# Patient Record
Sex: MALE | Race: BLACK OR AFRICAN AMERICAN | Employment: FULL TIME | URBAN - METROPOLITAN AREA
[De-identification: names, ages, dates, MRNs, and addresses within clinical notes are randomized per-mention and may not be internally consistent; named-entity substitution may affect disease eponyms.]

---

## 2021-05-14 PROBLEM — I25.10 CAD (CORONARY ARTERY DISEASE): Status: ACTIVE | Noted: 2021-05-14

## 2021-06-18 ENCOUNTER — HOSPITAL ENCOUNTER (OUTPATIENT)
Dept: CARDIAC REHAB | Age: 66
Discharge: HOME | End: 2021-06-18
Payer: MEDICARE

## 2021-06-18 VITALS — WEIGHT: 156 LBS | BODY MASS INDEX: 25.18 KG/M2

## 2021-06-18 PROCEDURE — 93798 PHYS/QHP OP CAR RHAB W/ECG: CPT

## 2021-06-18 RX ORDER — ATORVASTATIN CALCIUM 40 MG/1
40 TABLET, FILM COATED ORAL DAILY
COMMUNITY

## 2021-06-18 RX ORDER — VARENICLINE TARTRATE 1 MG/1
1 TABLET, FILM COATED ORAL DAILY
COMMUNITY

## 2021-06-18 RX ORDER — ASPIRIN 81 MG/1
81 TABLET ORAL DAILY
COMMUNITY

## 2021-06-18 RX ORDER — IBUPROFEN 200 MG
1 TABLET ORAL EVERY 24 HOURS
COMMUNITY

## 2021-06-18 RX ORDER — LISINOPRIL 10 MG/1
10 TABLET ORAL DAILY
COMMUNITY

## 2021-06-18 RX ORDER — PRASUGREL 10 MG/1
10 TABLET, FILM COATED ORAL
COMMUNITY

## 2021-06-18 NOTE — PROGRESS NOTES
CARDIAC REHAB INITIAL ITP FOR REVIEW AND SIGNATURE    Isaac Jorgensen 72 y.o. presented to cardiac rehab for an intake and a six minute walk test today with a primary diagnosis of PCI/BERNY. Patient's EF is 60%. Reubin Merlin has a history of Coronary artery disease and status post coronary artery stenting. Cardiac risk factors include smoking/ tobacco exposure, dyslipidemia, hypertension and these were reviewed with patient. Reubin Merlin is not  and lives alone. PHQ-9, depression score, is 6 and this is considered to behigh. The result was discussed with patient who confirms score to be accurate and  a copy of patient's results were sent to patient's PCP. Patient denied chest pain or SOB during 6 minute walk and the cardiac rhythm was in Normal Sinus Rhythm. Reubin Merlin will attend exercise and educational sessions 3 days a week in cardiac rehab for 36 sessions. Goals for Rehab:    Patient name: Reubin Merlin : 1955         Goals Comments   1. Complete smoking cessation   [x] initial  [] met                  [] not met  [] progressing Patient currently smokes 3 mini cigars every day. Patient wants to completely quit in 2-3 weeks   2.  Get off blood pressure medicine using exercise and diet   [x] initial  [] met                  [] not met  [] progressing Keep blood pressure 120/80 or lower consistently by next recert       Dani Jeans, RN 2021 10:23 AM    Cardiac ITP     CR INTAKE from 2021 in [place] [address]    Treatment Diagnosis   Treatment Diagnosis 1 PCI    PCI Date 21    Referral Date 21    Individual Treatment Plan   ITP Visit Type Initial Assessment    1st Date of Exercise  21    ITP Next Review Date 21    Visit #/Total Visits     EF % 60 %    Risk Stratification Moderate    ITP Exercise, Psychosocial, Nutrition, Tobacco, Education    Exercise    Stages of Change Preparation    DASI Total Score 23.45    Assisted Devices None    Total Score  0   Test Six minute walk test    Exercise Prescription   Mode Treadmill, Bike, Ergometer, Stepper    Frequency per week 2-3    Duration per session 35-55    RPE 11-13    Target Heart Rate     Resistance Training Yes    Exercise Blood Pressures   Exercise Activity at Home   Type Lifting equipment for work [employer]   Exercise Education   Education Signs/Symptoms to report, Exercise safety, RPE scale, Equipment orientation    Exercise Target Goal   Target Goal(s) Individual exercise RX, Aerobic activity 30 + minutes/day  5 days/week, BP < 140/90 or < 130/80, if DM or CKD    Psychosocial   Stages of Change Preparation    [facility] Total Score 29    PHQ 9 Score 6    Psychosocial Intervention   Interventions Cardiologist notified, PCP notified    Currently Taking Psychotropic Meds No    Psychosocial Education   Education Impact self care behaviors on health, Relaxation techniques, Sexual activity, Stress management class    Psychosocial Target Goals   Target Goal(s) Engages in self-care behaviors, Maximizes coping skills, Positive support group, Assess presence or absence of depression using a valid screening tool, Demonstrates appropriate interaction with others    Uses Stress Mgmt Techniques Yes    Nutrition   Stages of Change Preparation    Diabetes No    Lipids   Date Lipids Drawn 05/15/21    Total 142    HDL 52    LDL 66    Triglycerides 124    Lipid Med(s) Lipitor    Lipid Med Change(s) No    Weight Management   Height  5' 6\" (1.676 m)    Alcohol Weekly    Type 2 mixed drinks     Amount twice a week    Rate Your Plate Total Score 48    Nutrition Intervention   Dietitian Consult No    Nurse/Patient Discussion Yes    Nutrition Class Yes    Nutrition Education   Education Low fat & cholesterol diet, Low sodium diet, Carb-controlled diet, Healthy eating    Nutrition Target Goals   Education   Learning Barrier [facility] to learn [facility] Intervention   Education Schedule Given Yes    Patient Education    Education CAD, Risk factors, Med Compliance, Cardiac A&P, Signs/Symptoms of Angina    Hypertension Yes    Hypertension Controlled Yes    Is BP WDL?  Yes    Med(s) Change No    BP Meds Lisinopril    Education Target Goals   Target Goals Understand target guidelines for lipids, Understand target guidelines for B/P, Risk factors, Medication compliance    Physician Response   Exercise     CR INTAKE from 6/18/2021 in [facility]   Any problems changes since your last visit Other (comment)  [initial visit]   Any symptoms while exercising denies   Psychosocial/Stress Level 0   Resting EKG rhythm SR   Tobacco Use Current   Tobacco Cessation Interventions Verbal counseling   Enter O2 Saturation and Liter Flow 99   ITP Next Review Date 07/18/21   Visit Number/Total Visits 1/36   What is plan for next session start exercise Rx   On Call Medical Director Immediately Available Lexa   Target Heart Rate(Range)    Resting HR 69   Resting /66   Recovery HR 68   Recovery /68   Weight 70.8 kg (156 lb)   Exercise EKG Rhythm SR   Exercise Duration 6   Peak HR 94   Peak /61   Peak RPE 7   Peak Mets 1.7   Asymptomatic yes   O2 Saturation 99   Total Minutes 6

## 2021-06-21 ENCOUNTER — HOSPITAL ENCOUNTER (OUTPATIENT)
Dept: CARDIAC REHAB | Age: 66
Discharge: HOME | End: 2021-06-21
Payer: MEDICARE

## 2021-06-21 VITALS — BODY MASS INDEX: 25.18 KG/M2 | WEIGHT: 156 LBS

## 2021-06-21 PROCEDURE — 93798 PHYS/QHP OP CAR RHAB W/ECG: CPT

## 2021-06-23 ENCOUNTER — HOSPITAL ENCOUNTER (OUTPATIENT)
Dept: CARDIAC REHAB | Age: 66
Discharge: HOME | End: 2021-06-23
Payer: MEDICARE

## 2021-06-23 VITALS — WEIGHT: 157 LBS | BODY MASS INDEX: 25.34 KG/M2

## 2021-06-23 PROCEDURE — 93798 PHYS/QHP OP CAR RHAB W/ECG: CPT

## 2021-06-24 ENCOUNTER — HOSPITAL ENCOUNTER (OUTPATIENT)
Dept: CARDIAC REHAB | Age: 66
Discharge: HOME | End: 2021-06-24
Payer: MEDICARE

## 2021-06-24 VITALS — WEIGHT: 158 LBS | BODY MASS INDEX: 25.5 KG/M2

## 2021-06-24 PROCEDURE — 93798 PHYS/QHP OP CAR RHAB W/ECG: CPT

## 2021-06-28 ENCOUNTER — HOSPITAL ENCOUNTER (OUTPATIENT)
Dept: CARDIAC REHAB | Age: 66
Discharge: HOME | End: 2021-06-28
Payer: MEDICARE

## 2021-06-28 VITALS — WEIGHT: 160 LBS | BODY MASS INDEX: 25.82 KG/M2

## 2021-06-28 PROCEDURE — 93798 PHYS/QHP OP CAR RHAB W/ECG: CPT

## 2021-06-29 ENCOUNTER — HOSPITAL ENCOUNTER (OUTPATIENT)
Dept: CARDIAC REHAB | Age: 66
Discharge: HOME | End: 2021-06-29
Payer: MEDICARE

## 2021-06-29 VITALS — BODY MASS INDEX: 25.26 KG/M2 | WEIGHT: 156.5 LBS

## 2021-06-29 PROCEDURE — 93798 PHYS/QHP OP CAR RHAB W/ECG: CPT

## 2021-06-30 ENCOUNTER — APPOINTMENT (OUTPATIENT)
Dept: CARDIAC REHAB | Age: 66
End: 2021-06-30
Payer: MEDICARE

## 2021-07-01 ENCOUNTER — HOSPITAL ENCOUNTER (OUTPATIENT)
Dept: CARDIAC REHAB | Age: 66
Discharge: HOME | End: 2021-07-01
Payer: MEDICARE

## 2021-07-01 VITALS — BODY MASS INDEX: 25.34 KG/M2 | WEIGHT: 157 LBS

## 2021-07-01 PROCEDURE — 93798 PHYS/QHP OP CAR RHAB W/ECG: CPT

## 2021-07-02 ENCOUNTER — HOSPITAL ENCOUNTER (OUTPATIENT)
Dept: CARDIAC REHAB | Age: 66
Discharge: HOME | End: 2021-07-02
Payer: MEDICARE

## 2021-07-02 VITALS — BODY MASS INDEX: 25.5 KG/M2 | WEIGHT: 158 LBS

## 2021-07-02 PROCEDURE — 93798 PHYS/QHP OP CAR RHAB W/ECG: CPT

## 2021-07-05 ENCOUNTER — APPOINTMENT (OUTPATIENT)
Dept: CARDIAC REHAB | Age: 66
End: 2021-07-05
Payer: MEDICARE

## 2021-07-06 ENCOUNTER — HOSPITAL ENCOUNTER (OUTPATIENT)
Dept: CARDIAC REHAB | Age: 66
Discharge: HOME | End: 2021-07-06
Payer: MEDICARE

## 2021-07-06 VITALS — BODY MASS INDEX: 25.5 KG/M2 | WEIGHT: 158 LBS

## 2021-07-06 PROCEDURE — 93798 PHYS/QHP OP CAR RHAB W/ECG: CPT

## 2021-07-07 ENCOUNTER — HOSPITAL ENCOUNTER (OUTPATIENT)
Dept: CARDIAC REHAB | Age: 66
Discharge: HOME | End: 2021-07-07
Payer: MEDICARE

## 2021-07-07 VITALS — BODY MASS INDEX: 25.34 KG/M2 | WEIGHT: 157 LBS

## 2021-07-07 PROCEDURE — 93798 PHYS/QHP OP CAR RHAB W/ECG: CPT

## 2021-07-08 ENCOUNTER — HOSPITAL ENCOUNTER (OUTPATIENT)
Dept: CARDIAC REHAB | Age: 66
Discharge: HOME | End: 2021-07-08
Payer: MEDICARE

## 2021-07-08 VITALS — WEIGHT: 158.5 LBS | BODY MASS INDEX: 25.58 KG/M2

## 2021-07-08 PROCEDURE — 93798 PHYS/QHP OP CAR RHAB W/ECG: CPT

## 2021-07-09 ENCOUNTER — HOSPITAL ENCOUNTER (OUTPATIENT)
Dept: CARDIAC REHAB | Age: 66
Discharge: HOME | End: 2021-07-09
Payer: MEDICARE

## 2021-07-09 VITALS — WEIGHT: 158 LBS | BODY MASS INDEX: 25.5 KG/M2

## 2021-07-09 PROCEDURE — 93798 PHYS/QHP OP CAR RHAB W/ECG: CPT

## 2021-07-12 ENCOUNTER — HOSPITAL ENCOUNTER (OUTPATIENT)
Dept: CARDIAC REHAB | Age: 66
Discharge: HOME | End: 2021-07-12
Payer: MEDICARE

## 2021-07-12 VITALS — BODY MASS INDEX: 25.5 KG/M2 | WEIGHT: 158 LBS

## 2021-07-12 PROCEDURE — 93798 PHYS/QHP OP CAR RHAB W/ECG: CPT

## 2021-07-13 ENCOUNTER — HOSPITAL ENCOUNTER (OUTPATIENT)
Dept: CARDIAC REHAB | Age: 66
Discharge: HOME | End: 2021-07-13
Payer: MEDICARE

## 2021-07-13 VITALS — BODY MASS INDEX: 25.5 KG/M2 | WEIGHT: 158 LBS

## 2021-07-13 PROCEDURE — 93798 PHYS/QHP OP CAR RHAB W/ECG: CPT

## 2021-07-14 ENCOUNTER — APPOINTMENT (OUTPATIENT)
Dept: CARDIAC REHAB | Age: 66
End: 2021-07-14
Payer: MEDICARE

## 2021-07-15 ENCOUNTER — HOSPITAL ENCOUNTER (OUTPATIENT)
Dept: CARDIAC REHAB | Age: 66
Discharge: HOME | End: 2021-07-15
Payer: MEDICARE

## 2021-07-15 VITALS — WEIGHT: 158 LBS | BODY MASS INDEX: 25.5 KG/M2

## 2021-07-15 PROCEDURE — 93798 PHYS/QHP OP CAR RHAB W/ECG: CPT

## 2021-07-16 ENCOUNTER — HOSPITAL ENCOUNTER (OUTPATIENT)
Dept: CARDIAC REHAB | Age: 66
Discharge: HOME | End: 2021-07-16
Payer: MEDICARE

## 2021-07-16 VITALS — BODY MASS INDEX: 25.5 KG/M2 | WEIGHT: 158 LBS

## 2021-07-16 PROCEDURE — 93798 PHYS/QHP OP CAR RHAB W/ECG: CPT

## 2021-07-16 NOTE — PROGRESS NOTES
CARDIAC REHAB ITP REASSESSMENT FOR REVIEW AND SIGNATURE  Patient name: Jana Chen : 1955     Visits from Start of Care: 15      Reporting Period:  to     Subjective Reports:  No complaints, progressing well    Goals Comments   1. 1. Complete smoking cessation   [] met                  [] not met  [x] progressing Patient currently smokes 3 mini cigars every day. Patient wants to completely quit in 2-3 weeks. Pt. Just prescribed new medication to help smoking cessation    2. Get off blood pressure medicine using exercise and diet    [] met                  [] not met  [x] progressing Keep blood pressure 120/80 or lower consistently by next recert     Key functional changes: Pt. Increased peak METS on the treadmill from 0.8 to 2.0 mph during this recert    Problems/ barriers to goal attainment: None     Assessment / Recommendations: Continue w/ rehab    Maria M Bazzi RN 2021 9:44 AM    Cardiac ITP     CR PHASE II from 7/15/2021 in [place] [address]   Treatment Diagnosis 1 PCI   PCI Date 21   Referral Date 21   ITP Visit Type Re-Assessment   1st Date of Exercise  21   ITP Next Review Date 21   Visit #/Total Visits 15/36   EF % 60 %   Risk Stratification Moderate   ITP Exercise, Psychosocial, Nutrition, Tobacco, Education   Stages of Change Action   Assisted Devices None   Test Six minute walk test   Mode Treadmill, Bike, Ergometer, Stepper   Frequency per week 2-3   Duration per session 35-55   Intensity  METS       1.7-3.0   RPE 11-13   Target Heart Rate    Resistance Training Yes   Resting /60   Peak /61   Is BP WDL?  Yes   Type Lifting equipment for work   Education Signs/Symptoms to report, Exercise safety, RPE scale, Equipment orientation   Target Goal(s) Individual exercise RX, Aerobic activity 30 + minutes/day  5 days/week, BP < 140/90 or < 130/80, if DM or CKD   Stages of Change Action   Interventions Cardiologist notified   Currently Taking Psychotropic Meds No   Education Impact self care behaviors on health, Relaxation techniques, Sexual activity, Stress management class   Target Goal(s) Engages in self-care behaviors, Maximizes coping skills, Positive support group, Assess presence or absence of depression using a valid screening tool, Demonstrates appropriate interaction with others   Uses Stress Mgmt Techniques Yes   Stages of Change Action   Diabetes No   Date Lipids Drawn 05/15/21   Total 142   HDL 52   LDL 66   Triglycerides 124   Lipid Med(s) Lipitor   Lipid Med Change(s) No   Weight  71.7 kg (158 lb)   Height  5' 6\" (1.676 m)   BMI 25.56   Alcohol Weekly   Type 2 mixed drinks    Amount twice a week   Dietitian Consult No   Nurse/Patient Discussion Yes   Nutrition Class Yes   Learning Barrier Ready to learn   Education Schedule Given Yes   Education CAD, Risk factors, Med Compliance, Cardiac A&P, Signs/Symptoms of Angina   Hypertension Yes   Hypertension Controlled Yes   Is BP WDL?  Yes   Med(s) Change No   BP Meds Lisinopril   Target Goals Understand target guidelines for lipids, Understand target guidelines for B/P, Risk factors, Medication compliance   Exercise     CR PHASE II from 7/15/2021 in [facility] 163   Any problems changes since your last visit Denies   Any symptoms while exercising denies   Psychosocial/Stress Level 0   Resting EKG rhythm SR   Tobacco Use Current   Tobacco Cessation Interventions Verbal counseling   ITP Next Review Date 08/16/21   Visit Number/Total Visits 15/36   On Call Medical Director Immediately Available Marky   Target Heart Rate(Range)    Resting HR 86   Resting /60   Recovery HR 83   Recovery /61   Weight 71.7 kg (158 lb)   Exercise EKG Rhythm SR/ST   Exercise Duration 45   Peak    Peak RPE 13   Peak Mets 6.1   Asymptomatic yes   Total Minutes 55

## 2021-07-19 ENCOUNTER — HOSPITAL ENCOUNTER (OUTPATIENT)
Dept: CARDIAC REHAB | Age: 66
Discharge: HOME | End: 2021-07-19
Payer: MEDICARE

## 2021-07-19 VITALS — BODY MASS INDEX: 25.66 KG/M2 | WEIGHT: 159 LBS

## 2021-07-19 PROCEDURE — 93798 PHYS/QHP OP CAR RHAB W/ECG: CPT

## 2021-07-20 ENCOUNTER — APPOINTMENT (OUTPATIENT)
Dept: CARDIAC REHAB | Age: 66
End: 2021-07-20
Payer: MEDICARE

## 2021-07-21 ENCOUNTER — HOSPITAL ENCOUNTER (OUTPATIENT)
Dept: CARDIAC REHAB | Age: 66
Discharge: HOME | End: 2021-07-21
Payer: MEDICARE

## 2021-07-21 VITALS — BODY MASS INDEX: 25.82 KG/M2 | WEIGHT: 160 LBS

## 2021-07-21 PROCEDURE — 93798 PHYS/QHP OP CAR RHAB W/ECG: CPT

## 2021-07-22 ENCOUNTER — APPOINTMENT (OUTPATIENT)
Dept: CARDIAC REHAB | Age: 66
End: 2021-07-22
Payer: MEDICARE

## 2021-07-27 ENCOUNTER — HOSPITAL ENCOUNTER (OUTPATIENT)
Dept: CARDIAC REHAB | Age: 66
Discharge: HOME | End: 2021-07-27
Payer: MEDICARE

## 2021-07-27 ENCOUNTER — APPOINTMENT (OUTPATIENT)
Dept: CARDIAC REHAB | Age: 66
End: 2021-07-27
Payer: MEDICARE

## 2021-07-27 VITALS — BODY MASS INDEX: 25.5 KG/M2 | WEIGHT: 158 LBS

## 2021-07-27 PROCEDURE — 93798 PHYS/QHP OP CAR RHAB W/ECG: CPT

## 2021-07-29 ENCOUNTER — HOSPITAL ENCOUNTER (OUTPATIENT)
Dept: CARDIAC REHAB | Age: 66
Discharge: HOME | End: 2021-07-29
Payer: MEDICARE

## 2021-07-29 ENCOUNTER — APPOINTMENT (OUTPATIENT)
Dept: CARDIAC REHAB | Age: 66
End: 2021-07-29
Payer: MEDICARE

## 2021-07-29 VITALS — WEIGHT: 161 LBS | BODY MASS INDEX: 25.99 KG/M2

## 2021-07-29 PROCEDURE — 93798 PHYS/QHP OP CAR RHAB W/ECG: CPT

## 2021-07-30 ENCOUNTER — APPOINTMENT (OUTPATIENT)
Dept: CARDIAC REHAB | Age: 66
End: 2021-07-30
Payer: MEDICARE

## 2021-08-02 ENCOUNTER — APPOINTMENT (OUTPATIENT)
Dept: CARDIAC REHAB | Age: 66
End: 2021-08-02
Payer: MEDICARE

## 2021-08-04 ENCOUNTER — APPOINTMENT (OUTPATIENT)
Dept: CARDIAC REHAB | Age: 66
End: 2021-08-04
Payer: MEDICARE

## 2021-08-04 ENCOUNTER — HOSPITAL ENCOUNTER (OUTPATIENT)
Dept: CARDIAC REHAB | Age: 66
Discharge: HOME | End: 2021-08-04
Payer: MEDICARE

## 2021-08-04 VITALS — BODY MASS INDEX: 25.34 KG/M2 | WEIGHT: 157 LBS

## 2021-08-04 PROCEDURE — 93798 PHYS/QHP OP CAR RHAB W/ECG: CPT

## 2021-08-06 ENCOUNTER — APPOINTMENT (OUTPATIENT)
Dept: CARDIAC REHAB | Age: 66
End: 2021-08-06
Payer: MEDICARE

## 2021-08-06 NOTE — PROGRESS NOTES
CARDIAC REHAB ITP REASSESSMENT FOR REVIEW AND SIGNATURE  Patient name: Harjinder Simon : 1955      Visits from Start of Care: 21                                                  Reporting Period:  to      Subjective Reports:  No complaints, progressing well          Goals Comments   1. 1. Complete smoking cessation    []? met                      []? not met  [x]? progressing Patient currently smokes 3 mini cigars every day. Nikki Land wants to completely quit in 2-3 weeks. Pt. Just prescribed new medication to help smoking cessation    2. Get off blood pressure medicine using exercise and diet     []? met                      []? not met  [x]? progressing Keep blood pressure 120/80 or lower consistently by next recert      Key functional changes: Pt. Increased peak METS on the treadmill from 2.0 to 2.5 mph w/ 2% grade during this recert     Problems/ barriers to goal attainment: chronic shoulder stiffness      Assessment / Recommendations: Continue w/ rehab     Nicolás Love RN 2021 4:31 PM    Cardiac ITP     CR PHASE II from 2021 in [place] [address]   Treatment Diagnosis 1 PCI   PCI Date 21   Referral Date 21   ITP Visit Type Re-Assessment   1st Date of Exercise  21   ITP Next Review Date 21   Visit #/Total Visits    EF % 60 %   Risk Stratification Moderate   ITP Exercise, Psychosocial, Nutrition, Tobacco, Education   Stages of Change Action   Assisted Devices None   Mode Treadmill, Bike, Ergometer, Stepper   Frequency per week 2-3   Duration per session 35-55   Intensity  METS       1.7-3.6   RPE 11-13   Target Heart Rate    Resistance Training Yes   Resting /75   Peak /75   Is BP WDL?  Yes   Type Lifting equipment for work   Education Signs/Symptoms to report, Exercise safety, RPE scale, Equipment orientation   Target Goal(s) Individual exercise RX, Aerobic activity 30 + minutes/day  5 days/week, BP < 140/90 or < 130/80, if DM or CKD   Stages of Change Action   Interventions Cardiologist notified   Currently Taking Psychotropic Meds No   Education Impact self care behaviors on health, Relaxation techniques, Sexual activity, Stress management class   Target Goal(s) Engages in self-care behaviors, Maximizes coping skills, Positive support group, Assess presence or absence of depression using a valid screening tool, Demonstrates appropriate interaction with others   Uses Stress Mgmt Techniques Yes   Stages of Change Action   Diabetes No   Date Lipids Drawn 05/15/21   Total 142   HDL 52   LDL 66   Triglycerides 124   Lipid Med(s) Lipitor   Lipid Med Change(s) No   Weight  71.2 kg (157 lb)   Height  5' 6\" (1.676 m)   BMI 25.39   Alcohol Weekly   Type 2 mixed drinks    Amount twice a week   Dietitian Consult No   Nurse/Patient Discussion Yes   Nutrition Class Yes   Diabetes Education Referral No   Lipid Clinic Referral No   Weight Management Referral No   Education Low fat & cholesterol diet, Carb-controlled diet, Low sodium diet, Healthy eating   Learning Barrier Ready to learn   Education Schedule Given Yes   Education CAD, Risk factors, Med Compliance, Cardiac A&P, Signs/Symptoms of Angina   Hypertension Yes   Hypertension Controlled Yes   Is BP WDL?  Yes   Med(s) Change No   BP Meds Lisinopril   Target Goals Understand target guidelines for lipids, Understand target guidelines for B/P, Risk factors, Medication compliance   Exercise     CR PHASE II from 8/4/2021 in [facility] 1634   Any problems changes since your last visit Denies   Any symptoms while exercising denies   Psychosocial/Stress Level 0   Resting EKG rhythm SR   Tobacco Use Current   Tobacco Cessation Interventions Verbal counseling   ITP Next Review Date 09/05/21   Visit Number/Total Visits 21/36   On Call Medical Director Immediately Available [place]   Target Heart Rate(Range)    Resting HR 73   Resting /75   Recovery HR 76   Recovery /64   Weight 71.2 kg (157 lb)   Exercise EKG Rhythm SR/ST   Exercise Duration 40   Peak    Peak RPE 15   Peak Mets 3.3   Asymptomatic yes   Total Minutes 50

## 2021-08-09 ENCOUNTER — HOSPITAL ENCOUNTER (OUTPATIENT)
Dept: CARDIAC REHAB | Age: 66
Discharge: HOME | End: 2021-08-09
Payer: MEDICARE

## 2021-08-09 ENCOUNTER — APPOINTMENT (OUTPATIENT)
Dept: CARDIAC REHAB | Age: 66
End: 2021-08-09
Payer: MEDICARE

## 2021-08-09 VITALS — BODY MASS INDEX: 25.34 KG/M2 | WEIGHT: 157 LBS

## 2021-08-09 PROCEDURE — 93798 PHYS/QHP OP CAR RHAB W/ECG: CPT

## 2021-08-10 ENCOUNTER — APPOINTMENT (OUTPATIENT)
Dept: CARDIAC REHAB | Age: 66
End: 2021-08-10
Payer: MEDICARE

## 2021-08-11 ENCOUNTER — HOSPITAL ENCOUNTER (OUTPATIENT)
Dept: CARDIAC REHAB | Age: 66
Discharge: HOME | End: 2021-08-11
Payer: MEDICARE

## 2021-08-11 ENCOUNTER — APPOINTMENT (OUTPATIENT)
Dept: CARDIAC REHAB | Age: 66
End: 2021-08-11
Payer: MEDICARE

## 2021-08-11 VITALS — WEIGHT: 154 LBS | BODY MASS INDEX: 24.86 KG/M2

## 2021-08-11 PROCEDURE — 93798 PHYS/QHP OP CAR RHAB W/ECG: CPT

## 2021-08-13 ENCOUNTER — APPOINTMENT (OUTPATIENT)
Dept: CARDIAC REHAB | Age: 66
End: 2021-08-13
Payer: MEDICARE

## 2021-08-13 ENCOUNTER — HOSPITAL ENCOUNTER (OUTPATIENT)
Dept: CARDIAC REHAB | Age: 66
Discharge: HOME | End: 2021-08-13
Payer: MEDICARE

## 2021-08-13 VITALS — WEIGHT: 155 LBS | BODY MASS INDEX: 25.02 KG/M2

## 2021-08-13 PROCEDURE — 93798 PHYS/QHP OP CAR RHAB W/ECG: CPT

## 2021-08-16 ENCOUNTER — APPOINTMENT (OUTPATIENT)
Dept: CARDIAC REHAB | Age: 66
End: 2021-08-16
Payer: MEDICARE

## 2021-08-18 ENCOUNTER — APPOINTMENT (OUTPATIENT)
Dept: CARDIAC REHAB | Age: 66
End: 2021-08-18
Payer: MEDICARE

## 2021-08-20 ENCOUNTER — APPOINTMENT (OUTPATIENT)
Dept: CARDIAC REHAB | Age: 66
End: 2021-08-20
Payer: MEDICARE

## 2021-08-23 ENCOUNTER — APPOINTMENT (OUTPATIENT)
Dept: CARDIAC REHAB | Age: 66
End: 2021-08-23
Payer: MEDICARE

## 2021-08-25 ENCOUNTER — APPOINTMENT (OUTPATIENT)
Dept: CARDIAC REHAB | Age: 66
End: 2021-08-25
Payer: MEDICARE

## 2021-08-27 ENCOUNTER — APPOINTMENT (OUTPATIENT)
Dept: CARDIAC REHAB | Age: 66
End: 2021-08-27
Payer: MEDICARE

## 2021-08-30 ENCOUNTER — APPOINTMENT (OUTPATIENT)
Dept: CARDIAC REHAB | Age: 66
End: 2021-08-30
Payer: MEDICARE

## 2021-09-01 ENCOUNTER — APPOINTMENT (OUTPATIENT)
Dept: CARDIAC REHAB | Age: 66
End: 2021-09-01

## 2021-09-03 ENCOUNTER — APPOINTMENT (OUTPATIENT)
Dept: CARDIAC REHAB | Age: 66
End: 2021-09-03

## 2021-09-03 NOTE — PROGRESS NOTES
INCOMPLETE CARDIAC REHAB DISCHARGE NOTE FOR REVIEW AND SIGNATURE    Hiram Colindres Jameel  77 y.o. With diagnosis of PCI/BERNY attended phase II cardiac rehab for 24 sessions. Pt. Is being discharged because he is returning to work.     Ananya Aleman RN  9/3/2021

## 2021-09-08 ENCOUNTER — HOSPITAL ENCOUNTER (OUTPATIENT)
Dept: CARDIAC REHAB | Age: 66
End: 2021-09-08